# Patient Record
Sex: MALE | Race: WHITE | NOT HISPANIC OR LATINO | Employment: FULL TIME | URBAN - METROPOLITAN AREA
[De-identification: names, ages, dates, MRNs, and addresses within clinical notes are randomized per-mention and may not be internally consistent; named-entity substitution may affect disease eponyms.]

---

## 2018-04-24 ENCOUNTER — OFFICE VISIT (OUTPATIENT)
Dept: URGENT CARE | Facility: CLINIC | Age: 53
End: 2018-04-24

## 2018-04-24 VITALS
TEMPERATURE: 100 F | DIASTOLIC BLOOD PRESSURE: 72 MMHG | SYSTOLIC BLOOD PRESSURE: 112 MMHG | WEIGHT: 198.44 LBS | HEART RATE: 118 BPM | HEIGHT: 71 IN | RESPIRATION RATE: 18 BRPM | OXYGEN SATURATION: 96 % | BODY MASS INDEX: 27.78 KG/M2

## 2018-04-24 DIAGNOSIS — S82.891A CLOSED FRACTURE OF RIGHT ANKLE, INITIAL ENCOUNTER: Primary | ICD-10-CM

## 2018-04-24 PROCEDURE — 99202 OFFICE O/P NEW SF 15 MIN: CPT | Mod: S$GLB,,, | Performed by: FAMILY MEDICINE

## 2018-04-24 RX ORDER — IBUPROFEN 800 MG/1
800 TABLET ORAL EVERY 8 HOURS PRN
Qty: 30 TABLET | Refills: 1 | Status: SHIPPED | OUTPATIENT
Start: 2018-04-24 | End: 2019-04-24

## 2018-04-24 NOTE — PATIENT INSTRUCTIONS
Fracture à la jambe    Votre jambe est fracturée (cassée). Une fracture se traite à l'aide d'une attelle, d'un plâtre ou encore d'une botte spéciale. Il faudra environ 4 à 6 semaines pour que la fracture guérisse. Si votre fracture est de type sévère, vous avez peut-être besoin d'une intervention chirurgicale.  Soins à domicile  Suivez matt lignes directrices quand vous vous soignez chez vous :  · Vous recevrez une attelle, un plâtre, une botte ou un autre dispositif servant à empêcher la région blessée de [facility]. Sauf ordre contraire, utilisez nisha béquilles ou un ambulateur. Ne faites pas peser de poids tai la jambe blessée tant que votre fournisseur de soins de santé ne vous y a pas autorisé. (Vous pouvez louer nisha béquilles et un ambulateur dans nombreuses pharmacies et magasins de fournitures chirurgicales ou orthopédiques).  · Maintenez votre jambe en hauteur pour réduire la douleur et le gonflement. Quand vous dormez, placez un oreiller sous la jambe blessée. Quand vous vous asseyez, soutenez la jambe blessée de sorte qu'danial soit au même niveau que votre ceinture. Lexy est très important junior les 2 premiers jours (48 heures).  · Appliquez un bloc réfrigérant tai la région blessée. Faites cher pendant 20 minutes toutes les 1 à 2 heures le premier jour que vous soulagez votre douleur. Vous pouvez faire un bloc réfrigérant en enveloppant un sac en plastique contenant nisha glaçons dans une serviette fine. Pendant que la glace fond, faites attention à ne pas mouiller le plâtre, l'attelle ou la botte. Vous pouvez placer le bloc réfrigérant directement tai l'attelle ou le plâtre. Continuez d'utiliser le bloc réfrigérant 3 à 4 fois par jour pendant les 2 prochains jours. Puis, utilisez le bloc réfrigérant selon kanwal besoins pour soulager la douleur et le gonflement.  · Maintenez le plâtre, l'attelle ou la botte complètement au sec à tout moment. Baignez-vous en maintenant votre plâtre ou attelle ou botte en  dehors de l'eau. Protégez le plâtre, l'attelle ou la botte avec un grand sac plastique, maintenu par une bande élastique à l'extrémité. Si une botte ou un plâtre/une attelle en fibre de verre devient mouillé, séchez à l'aide d'un sèche-cheveux.  · Vous pouvez prendre de l'acétaminophène ou de l'ibuprofène pour contrôler la douleur, à moins qu'un autre analgésique vous ait été prescrit. Si vous avez une maladie chronique du foie ou du rein, parlez avec votre fournisseur de soins de santé sheela de prendre matt médicaments. Discutez aussi avec votre fournisseur si vous avez un ulcère à l'estomac ou un saignement gastro-intestinal.  · N'appliquez pas de crèmes ou d'objets sous le plâtre si vous avez nisha démangeaisons.  Soins de suivi  Réalisez le suivi avec votre fournisseur de soins de santé dans un délai de 7 jours, ou comme indiqué. Lexy vous permettra de vous assurer que votre os guérit de la façon appropriée. Si une attelle a été posée, celle-ci sera peut-être changée au profit d'un plâtre lors de votre prochaine visite.  Si une radiographie a été prise, un radiologue les regardera. Vous serez averti de toutes nouvelles conclusions susceptibles d'affecter kanwal soins.  Quand devez-vous chercher à obtenir un rosy médical  Appelez votre fournisseur de soins de santé immédiatement si ce cande suit se produit :  · Le plâtre se fissure  · Le plâtre ou l'attelle devient mouillé ou darwin  · Le plâtre ou l'attelle en fibre de verre reste mouillé(e) pendant plus de 24 heures  · Mauvaise odeur en provenance du plâtre ou le liquide de la plaie tache le plâtre  · L'oppression ou la pression sous le plâtre ou l'attelle s'aggravent  · Les orteils deviennent gonflés, froids, bleus, engourdis ou picotent  · Vous ne pouvez pas bouger kanwal orteils  · La peau autour du plâtre devient rouge  · Fièvre de 101 ºF (38,3 ºC) ou supérieure, ou suivant les indications de votre fournisseur de soins de santé  Date Last Reviewed: 2/15/2015  © 3583-3243  [employer]. [address] [place] [address], [place] [address]. All rights reserved. This information is not intended as a substitute for professional medical care. Always follow your healthcare professional's instructions.        Ankle Fracture, Distal Fibula  You have a fracture, or broken bone, of the end of the fibula bone. The fibula is one of two bones that support the ankle joint.    Home care  · You will be given a splint, cast, or special boot to prevent movement at the injury site. Do not put weight on a splint. It will break. Follow your healthcare providers advice about when to begin bearing weight on a cast or boot.  · Keep your leg elevated when sitting or lying down. When sleeping, place a pillow under the injured leg. When sitting, support the injured leg so it is level with your waist. This is very important during the first 48 hours.  · Keep the cast or splint completely dry at all times. When bathing, protect the cast or splint with 2 large plastic bags. Place 1 bag outside of the other. Tape each bag with duct tape at the top end. Water can still leak in even when the foot is covered. So it's best to keep the cast, splint, or boot away from water. If a fiberglass cast or splint gets wet, dry it with a hair dryer on a cool setting.  · Place an ice pack over the injured area for no more than 15 to 20 minutes. Do this every 3 to 6 hours for the first 24 to 48 hours. Continue this 3 to 4 times a day as needed. To make an ice pack, put ice cubes in a plastic bag that seals at the top. Wrap the bag in a clean, thin towel or cloth. Never put ice or an ice pack directly on the skin. The ice pack can be put right on the cast or splint. As the ice melts, be careful that the cast or splint doesnt get wet.  · You may use over-the-counter pain medicine to control pain, unless another pain medicine was prescribed. If you have chronic liver or kidney disease or ever had a stomach ulcer or GI bleeding,  talk with your provider before using these medicines.  Follow-up care  Follow up with your healthcare provider in 1 week, or as advised. This is to be sure the bone is healing properly. If you were given a splint, it may be changed to a cast after the swelling goes down.  If X-rays were taken, you will be told of any new findings that may affect your care.  When to seek medical advice  Call your healthcare provider right away if any of these occur:  · The plaster cast or splint becomes wet or soft  · The fiberglass cast or splint stays wet for more than 24 hours  · There is increased tightness or pain under the cast or splint  · Your toes become swollen, cold, blue, numb, or tingly  · The cast becomes loose  · The cast has a bad smell  · Sore areas develop under the cast  · The cast develops cracks or breaks   Date Last Reviewed: 11/23/2015  © 3691-7516 [email or web address]. [address] [place] [address], [place] [address]. All rights reserved. This information is not intended as a substitute for professional medical care. Always follow your healthcare professional's instructions.

## 2018-04-24 NOTE — PROGRESS NOTES
"Subjective:       Patient ID: Juan Pablo Meehan is a 52 y.o. male.    Vitals:  height is 5' 10.87" (1.8 m) and weight is 90 kg (198 lb 6.6 oz). His oral temperature is 99.6 °F (37.6 °C). His blood pressure is 112/72 and his pulse is 118 (abnormal). His respiration is 18 and oxygen saturation is 96%.     Chief Complaint: Foot Injury (Right Foot)    This is a 52 y.o. male with History reviewed. No pertinent past medical history.    Past Surgical History:  No date: BACK SURGERY  who presents today with a chief complaint of Right Foot Injury.  Patient states he was jogging when he fell.  Pt landed on his left knee, but injured his right ankle.       Foot Injury    The incident occurred 1 to 3 hours ago. The incident occurred in the street. The injury mechanism was a fall. The pain is present in the left knee, right ankle and right foot. The quality of the pain is described as aching. The pain is at a severity of 2/10. The pain is mild. Pertinent negatives include no numbness. He reports no foreign bodies present. The symptoms are aggravated by movement and weight bearing.     Review of Systems   Constitution: Negative for weakness and malaise/fatigue.   HENT: Negative for nosebleeds.    Cardiovascular: Negative for chest pain and syncope.   Respiratory: Negative for shortness of breath.    Musculoskeletal: Positive for joint pain and joint swelling. Negative for back pain and neck pain.   Gastrointestinal: Negative for abdominal pain.   Genitourinary: Negative for hematuria.   Neurological: Negative for dizziness and numbness.       Objective:      Physical Exam   Constitutional: He appears well-developed and well-nourished. No distress.   HENT:   Head: Normocephalic and atraumatic.   Musculoskeletal: He exhibits tenderness (right lateral malleolus - swollen, erythematous ).   Nursing note and vitals reviewed.      Assessment:       1. Closed fracture of right ankle, initial encounter        Plan:         Closed " fracture of right ankle, initial encounter  -     X-Ray Ankle Complete Right; Future; Expected date: 04/24/2018  -     ibuprofen (ADVIL,MOTRIN) 800 MG tablet; Take 1 tablet (800 mg total) by mouth every 8 (eight) hours as needed.  Dispense: 30 tablet; Refill: 1  -     Splint application; Future  -     CRUTCHES FOR HOME USE    to be seen by Ortho in his home [place] - [facility]. To be returning in 4 days.